# Patient Record
Sex: FEMALE | Race: BLACK OR AFRICAN AMERICAN
[De-identification: names, ages, dates, MRNs, and addresses within clinical notes are randomized per-mention and may not be internally consistent; named-entity substitution may affect disease eponyms.]

---

## 2019-11-19 ENCOUNTER — HOSPITAL ENCOUNTER (EMERGENCY)
Dept: HOSPITAL 61 - ER | Age: 59
Discharge: HOME | End: 2019-11-19
Payer: SELF-PAY

## 2019-11-19 VITALS — WEIGHT: 200 LBS | BODY MASS INDEX: 34.15 KG/M2 | HEIGHT: 64 IN

## 2019-11-19 VITALS — SYSTOLIC BLOOD PRESSURE: 147 MMHG | DIASTOLIC BLOOD PRESSURE: 102 MMHG

## 2019-11-19 DIAGNOSIS — M25.551: ICD-10-CM

## 2019-11-19 DIAGNOSIS — M25.552: ICD-10-CM

## 2019-11-19 DIAGNOSIS — G89.29: Primary | ICD-10-CM

## 2019-11-19 DIAGNOSIS — Z98.890: ICD-10-CM

## 2019-11-19 PROCEDURE — 99281 EMR DPT VST MAYX REQ PHY/QHP: CPT

## 2019-11-19 NOTE — PHYS DOC
Past Medical History


Past Medical History:  No Pertinent History


Past Surgical History:  


Alcohol Use:  Occasionally


Drug Use:  None





Adult General


Chief Complaint


Chief Complaint:  HIP PAIN





HPI


HPI


Patient is a 58 AA year old  female who presents with bilateral hip pain. 

Patient states hips been bothering her for greater than 6 months. Denies trauma 

or strain injury. Pain is worse with movement  Patient is tried various 

over-the-counter medications without relief. She has not been evaluated for this

condition by PCP but wanted to get her hips checked out this morning.





Review of Systems


Review of Systems


Review of symptoms as per history of present illness.


All other systems were reviewed and found to be within normal limits, except as 

documented in this note.





Physical Exam


Physical Exam





Constitutional: Well developed, well nourished, no acute distress, non-toxic 

appearance. []


HENT: Normocephalic, atraumatic, bilateral external ears normal, nose normal. []


Eyes: PERRLA, EOMI, conjunctiva normal. [] 


Neck: Normal range of motion. [] 


Extremities: No formation or swelling,. [] 


Neurologic: Alert and oriented X 3, normal motor function. []


Psychologic: Affect normal, judgement normal, mood normal. []





Current Patient Data


Vital Signs





                                   Vital Signs








  Date Time  Temp Pulse Resp B/P (MAP) Pulse Ox O2 Delivery O2 Flow Rate FiO2


 


19 04:00 98.6 100 16 147/102 (117) 97 Room Air  





 98.6       











EKG


EKG


[]





Radiology/Procedures


Radiology/Procedures


[]





Course & Med Decision Making


Course & Med Decision Making


Pertinent Labs and Imaging studies reviewed. (See chart for details)





Chronic hip pain without acute symptoms or complaints asymptomatic hypertension.

 Recommendations are for PCP evaluation.]





Dragon Disclaimer


Dragon Disclaimer


This electronic medical record was generated, in whole or in part, using a voice

recognition dictation system.





Departure


Departure


Impression:  


   Primary Impression:  


   Chronic pain of both hips


Disposition:  01 HOME, SELF-CARE


Condition:  STABLE


Referrals:  


NO PCP (PCP)


Patient Instructions:  Chronic Pain





Additional Instructions:  


Follow up with your PCP for evaluation of chronic hip pain.











RUFUS CLARK DO                   2019 04:33